# Patient Record
Sex: FEMALE | Race: WHITE | NOT HISPANIC OR LATINO | Employment: PART TIME | URBAN - METROPOLITAN AREA
[De-identification: names, ages, dates, MRNs, and addresses within clinical notes are randomized per-mention and may not be internally consistent; named-entity substitution may affect disease eponyms.]

---

## 2019-05-07 ENCOUNTER — HOSPITAL ENCOUNTER (OUTPATIENT)
Dept: RADIOLOGY | Facility: MEDICAL CENTER | Age: 29
End: 2019-05-07
Attending: OBSTETRICS & GYNECOLOGY
Payer: COMMERCIAL

## 2019-05-07 DIAGNOSIS — Z34.82 PRENATAL CARE, SUBSEQUENT PREGNANCY, SECOND TRIMESTER: ICD-10-CM

## 2019-05-07 PROCEDURE — 76805 OB US >/= 14 WKS SNGL FETUS: CPT

## 2019-06-26 ENCOUNTER — APPOINTMENT (OUTPATIENT)
Dept: OTHER | Facility: IMAGING CENTER | Age: 29
End: 2019-06-26

## 2019-08-13 ENCOUNTER — HOSPITAL ENCOUNTER (OUTPATIENT)
Dept: RADIOLOGY | Facility: MEDICAL CENTER | Age: 29
End: 2019-08-13
Attending: OBSTETRICS & GYNECOLOGY
Payer: COMMERCIAL

## 2019-08-13 DIAGNOSIS — Z34.83 ENCOUNTER FOR SUPERVISION OF OTHER NORMAL PREGNANCY, THIRD TRIMESTER: ICD-10-CM

## 2019-08-13 PROCEDURE — 76816 OB US FOLLOW-UP PER FETUS: CPT

## 2019-08-20 ENCOUNTER — APPOINTMENT (OUTPATIENT)
Dept: RADIOLOGY | Facility: MEDICAL CENTER | Age: 29
End: 2019-08-20
Attending: EMERGENCY MEDICINE
Payer: COMMERCIAL

## 2019-08-20 ENCOUNTER — HOSPITAL ENCOUNTER (OUTPATIENT)
Facility: MEDICAL CENTER | Age: 29
End: 2019-08-21
Attending: EMERGENCY MEDICINE | Admitting: INTERNAL MEDICINE
Payer: COMMERCIAL

## 2019-08-20 DIAGNOSIS — R47.01 APHASIA: ICD-10-CM

## 2019-08-20 DIAGNOSIS — R53.1 WEAKNESS: ICD-10-CM

## 2019-08-20 DIAGNOSIS — R29.810 FACIAL DROOP: ICD-10-CM

## 2019-08-20 PROBLEM — R74.8 ELEVATED ALKALINE PHOSPHATASE LEVEL: Status: ACTIVE | Noted: 2019-08-20

## 2019-08-20 PROBLEM — E87.1 HYPONATREMIA: Status: ACTIVE | Noted: 2019-08-20

## 2019-08-20 PROBLEM — R29.90 STROKE-LIKE SYMPTOMS: Status: ACTIVE | Noted: 2019-08-20

## 2019-08-20 PROBLEM — Z3A.36 36 WEEKS GESTATION OF PREGNANCY: Status: ACTIVE | Noted: 2019-08-20

## 2019-08-20 LAB
ABO GROUP BLD: NORMAL
ALBUMIN SERPL BCP-MCNC: 3.6 G/DL (ref 3.2–4.9)
ALBUMIN/GLOB SERPL: 1.1 G/DL
ALP SERPL-CCNC: 170 U/L (ref 30–99)
ALT SERPL-CCNC: 31 U/L (ref 2–50)
ANION GAP SERPL CALC-SCNC: 11 MMOL/L (ref 0–11.9)
APPEARANCE UR: CLEAR
APTT PPP: 26 SEC (ref 24.7–36)
AST SERPL-CCNC: 21 U/L (ref 12–45)
BASOPHILS # BLD AUTO: 0.3 % (ref 0–1.8)
BASOPHILS # BLD: 0.03 K/UL (ref 0–0.12)
BILIRUB SERPL-MCNC: 0.2 MG/DL (ref 0.1–1.5)
BILIRUB UR QL STRIP.AUTO: NEGATIVE
BLD GP AB SCN SERPL QL: NORMAL
BUN SERPL-MCNC: 9 MG/DL (ref 8–22)
CALCIUM SERPL-MCNC: 9.3 MG/DL (ref 8.4–10.2)
CHLORIDE SERPL-SCNC: 98 MMOL/L (ref 96–112)
CO2 SERPL-SCNC: 21 MMOL/L (ref 20–33)
COLOR UR: YELLOW
CREAT SERPL-MCNC: 0.66 MG/DL (ref 0.5–1.4)
EKG IMPRESSION: NORMAL
EOSINOPHIL # BLD AUTO: 0 K/UL (ref 0–0.51)
EOSINOPHIL NFR BLD: 0 % (ref 0–6.9)
ERYTHROCYTE [DISTWIDTH] IN BLOOD BY AUTOMATED COUNT: 39.2 FL (ref 35.9–50)
GLOBULIN SER CALC-MCNC: 3.2 G/DL (ref 1.9–3.5)
GLUCOSE BLD-MCNC: 77 MG/DL (ref 65–99)
GLUCOSE SERPL-MCNC: 82 MG/DL (ref 65–99)
GLUCOSE UR STRIP.AUTO-MCNC: NEGATIVE MG/DL
HCT VFR BLD AUTO: 41 % (ref 37–47)
HGB BLD-MCNC: 13.8 G/DL (ref 12–16)
IMM GRANULOCYTES # BLD AUTO: 0.05 K/UL (ref 0–0.11)
IMM GRANULOCYTES NFR BLD AUTO: 0.5 % (ref 0–0.9)
INR PPP: 0.85 (ref 0.87–1.13)
KETONES UR STRIP.AUTO-MCNC: NEGATIVE MG/DL
LEUKOCYTE ESTERASE UR QL STRIP.AUTO: NEGATIVE
LYMPHOCYTES # BLD AUTO: 2.21 K/UL (ref 1–4.8)
LYMPHOCYTES NFR BLD: 22.6 % (ref 22–41)
MCH RBC QN AUTO: 28.2 PG (ref 27–33)
MCHC RBC AUTO-ENTMCNC: 33.7 G/DL (ref 33.6–35)
MCV RBC AUTO: 83.7 FL (ref 81.4–97.8)
MICRO URNS: NORMAL
MONOCYTES # BLD AUTO: 0.68 K/UL (ref 0–0.85)
MONOCYTES NFR BLD AUTO: 7 % (ref 0–13.4)
NEUTROPHILS # BLD AUTO: 6.79 K/UL (ref 2–7.15)
NEUTROPHILS NFR BLD: 69.6 % (ref 44–72)
NITRITE UR QL STRIP.AUTO: NEGATIVE
NRBC # BLD AUTO: 0 K/UL
NRBC BLD-RTO: 0 /100 WBC
PH UR STRIP.AUTO: 7.5 [PH] (ref 5–8)
PLATELET # BLD AUTO: 238 K/UL (ref 164–446)
PMV BLD AUTO: 10.7 FL (ref 9–12.9)
POTASSIUM SERPL-SCNC: 3.9 MMOL/L (ref 3.6–5.5)
PROT SERPL-MCNC: 6.8 G/DL (ref 6–8.2)
PROT UR QL STRIP: NEGATIVE MG/DL
PROTHROMBIN TIME: 11.7 SEC (ref 12–14.6)
RBC # BLD AUTO: 4.9 M/UL (ref 4.2–5.4)
RBC UR QL AUTO: NEGATIVE
RH BLD: NORMAL
SODIUM SERPL-SCNC: 130 MMOL/L (ref 135–145)
SP GR UR STRIP.AUTO: <=1.005
T4 FREE SERPL-MCNC: 1.23 NG/DL (ref 0.58–1.64)
TROPONIN T SERPL-MCNC: <6 NG/L (ref 6–19)
TSH SERPL DL<=0.005 MIU/L-ACNC: 4.48 UIU/ML (ref 0.38–5.33)
WBC # BLD AUTO: 9.8 K/UL (ref 4.8–10.8)

## 2019-08-20 PROCEDURE — 99285 EMERGENCY DEPT VISIT HI MDM: CPT

## 2019-08-20 PROCEDURE — 84484 ASSAY OF TROPONIN QUANT: CPT

## 2019-08-20 PROCEDURE — 93005 ELECTROCARDIOGRAM TRACING: CPT | Performed by: EMERGENCY MEDICINE

## 2019-08-20 PROCEDURE — 86901 BLOOD TYPING SEROLOGIC RH(D): CPT

## 2019-08-20 PROCEDURE — 80053 COMPREHEN METABOLIC PANEL: CPT

## 2019-08-20 PROCEDURE — 36415 COLL VENOUS BLD VENIPUNCTURE: CPT

## 2019-08-20 PROCEDURE — 70498 CT ANGIOGRAPHY NECK: CPT

## 2019-08-20 PROCEDURE — 85730 THROMBOPLASTIN TIME PARTIAL: CPT

## 2019-08-20 PROCEDURE — 700117 HCHG RX CONTRAST REV CODE 255: Performed by: EMERGENCY MEDICINE

## 2019-08-20 PROCEDURE — G0378 HOSPITAL OBSERVATION PER HR: HCPCS

## 2019-08-20 PROCEDURE — 84443 ASSAY THYROID STIM HORMONE: CPT

## 2019-08-20 PROCEDURE — 94760 N-INVAS EAR/PLS OXIMETRY 1: CPT

## 2019-08-20 PROCEDURE — 85025 COMPLETE CBC W/AUTO DIFF WBC: CPT

## 2019-08-20 PROCEDURE — 99218 PR INITIAL OBSERVATION CARE,LEVL I: CPT | Performed by: INTERNAL MEDICINE

## 2019-08-20 PROCEDURE — 86900 BLOOD TYPING SEROLOGIC ABO: CPT

## 2019-08-20 PROCEDURE — 84439 ASSAY OF FREE THYROXINE: CPT

## 2019-08-20 PROCEDURE — 70450 CT HEAD/BRAIN W/O DYE: CPT

## 2019-08-20 PROCEDURE — 71045 X-RAY EXAM CHEST 1 VIEW: CPT

## 2019-08-20 PROCEDURE — 82962 GLUCOSE BLOOD TEST: CPT

## 2019-08-20 PROCEDURE — 81003 URINALYSIS AUTO W/O SCOPE: CPT

## 2019-08-20 PROCEDURE — 85610 PROTHROMBIN TIME: CPT

## 2019-08-20 PROCEDURE — 86850 RBC ANTIBODY SCREEN: CPT

## 2019-08-20 PROCEDURE — 70496 CT ANGIOGRAPHY HEAD: CPT

## 2019-08-20 RX ORDER — SODIUM CHLORIDE 9 MG/ML
INJECTION, SOLUTION INTRAVENOUS CONTINUOUS
Status: DISCONTINUED | OUTPATIENT
Start: 2019-08-20 | End: 2019-08-21 | Stop reason: HOSPADM

## 2019-08-20 RX ORDER — ONDANSETRON 4 MG/1
4 TABLET, ORALLY DISINTEGRATING ORAL EVERY 4 HOURS PRN
Status: DISCONTINUED | OUTPATIENT
Start: 2019-08-20 | End: 2019-08-21 | Stop reason: HOSPADM

## 2019-08-20 RX ORDER — ONDANSETRON 2 MG/ML
4 INJECTION INTRAMUSCULAR; INTRAVENOUS EVERY 4 HOURS PRN
Status: DISCONTINUED | OUTPATIENT
Start: 2019-08-20 | End: 2019-08-21 | Stop reason: HOSPADM

## 2019-08-20 RX ORDER — ACETAMINOPHEN 325 MG/1
650 TABLET ORAL EVERY 6 HOURS PRN
Status: DISCONTINUED | OUTPATIENT
Start: 2019-08-20 | End: 2019-08-21 | Stop reason: HOSPADM

## 2019-08-20 RX ADMIN — IOHEXOL 100 ML: 350 INJECTION, SOLUTION INTRAVENOUS at 21:52

## 2019-08-20 ASSESSMENT — ENCOUNTER SYMPTOMS
DIARRHEA: 0
SENSORY CHANGE: 1
CHILLS: 0
FALLS: 0
PALPITATIONS: 0
COUGH: 0
TINGLING: 0
ABDOMINAL PAIN: 0
MYALGIAS: 0
LOSS OF CONSCIOUSNESS: 0
SHORTNESS OF BREATH: 0
WEAKNESS: 0
DEPRESSION: 0
STRIDOR: 0
SPUTUM PRODUCTION: 0
HEADACHES: 1
DIZZINESS: 0
CONSTIPATION: 0
NAUSEA: 0
FEVER: 0
VOMITING: 0
SPEECH CHANGE: 1

## 2019-08-21 ENCOUNTER — APPOINTMENT (OUTPATIENT)
Dept: CARDIOLOGY | Facility: MEDICAL CENTER | Age: 29
End: 2019-08-21
Attending: INTERNAL MEDICINE
Payer: COMMERCIAL

## 2019-08-21 ENCOUNTER — APPOINTMENT (OUTPATIENT)
Dept: RADIOLOGY | Facility: MEDICAL CENTER | Age: 29
End: 2019-08-21
Attending: INTERNAL MEDICINE
Payer: COMMERCIAL

## 2019-08-21 ENCOUNTER — PATIENT OUTREACH (OUTPATIENT)
Dept: HEALTH INFORMATION MANAGEMENT | Facility: OTHER | Age: 29
End: 2019-08-21

## 2019-08-21 VITALS
BODY MASS INDEX: 25.16 KG/M2 | HEART RATE: 68 BPM | OXYGEN SATURATION: 96 % | SYSTOLIC BLOOD PRESSURE: 106 MMHG | HEIGHT: 68 IN | RESPIRATION RATE: 16 BRPM | TEMPERATURE: 98.5 F | DIASTOLIC BLOOD PRESSURE: 57 MMHG | WEIGHT: 166.01 LBS

## 2019-08-21 LAB
ANION GAP SERPL CALC-SCNC: 9 MMOL/L (ref 0–11.9)
BUN SERPL-MCNC: 8 MG/DL (ref 8–22)
CALCIUM SERPL-MCNC: 8.9 MG/DL (ref 8.4–10.2)
CHLORIDE SERPL-SCNC: 101 MMOL/L (ref 96–112)
CHOLEST SERPL-MCNC: 299 MG/DL (ref 100–199)
CO2 SERPL-SCNC: 20 MMOL/L (ref 20–33)
CREAT SERPL-MCNC: 0.54 MG/DL (ref 0.5–1.4)
ERYTHROCYTE [DISTWIDTH] IN BLOOD BY AUTOMATED COUNT: 39.6 FL (ref 35.9–50)
EST. AVERAGE GLUCOSE BLD GHB EST-MCNC: 108 MG/DL
GLUCOSE SERPL-MCNC: 76 MG/DL (ref 65–99)
HBA1C MFR BLD: 5.4 % (ref 0–5.6)
HCT VFR BLD AUTO: 37 % (ref 37–47)
HDLC SERPL-MCNC: 96 MG/DL
HGB BLD-MCNC: 12.3 G/DL (ref 12–16)
LDLC SERPL CALC-MCNC: 160 MG/DL
LV EJECT FRACT  99904: 55
LV EJECT FRACT MOD 2C 99903: 54.77
LV EJECT FRACT MOD 4C 99902: 47.42
LV EJECT FRACT MOD BP 99901: 52.36
MCH RBC QN AUTO: 28.3 PG (ref 27–33)
MCHC RBC AUTO-ENTMCNC: 33.2 G/DL (ref 33.6–35)
MCV RBC AUTO: 85.3 FL (ref 81.4–97.8)
PLATELET # BLD AUTO: 195 K/UL (ref 164–446)
PMV BLD AUTO: 10.9 FL (ref 9–12.9)
POTASSIUM SERPL-SCNC: 3.6 MMOL/L (ref 3.6–5.5)
RBC # BLD AUTO: 4.34 M/UL (ref 4.2–5.4)
SODIUM SERPL-SCNC: 130 MMOL/L (ref 135–145)
TRIGL SERPL-MCNC: 213 MG/DL (ref 0–149)
WBC # BLD AUTO: 10.2 K/UL (ref 4.8–10.8)

## 2019-08-21 PROCEDURE — 80061 LIPID PANEL: CPT

## 2019-08-21 PROCEDURE — 99217 PR OBSERVATION CARE DISCHARGE: CPT | Performed by: HOSPITALIST

## 2019-08-21 PROCEDURE — 97535 SELF CARE MNGMENT TRAINING: CPT

## 2019-08-21 PROCEDURE — G0378 HOSPITAL OBSERVATION PER HR: HCPCS

## 2019-08-21 PROCEDURE — 70551 MRI BRAIN STEM W/O DYE: CPT

## 2019-08-21 PROCEDURE — 93306 TTE W/DOPPLER COMPLETE: CPT

## 2019-08-21 PROCEDURE — 85027 COMPLETE CBC AUTOMATED: CPT

## 2019-08-21 PROCEDURE — 700102 HCHG RX REV CODE 250 W/ 637 OVERRIDE(OP): Performed by: INTERNAL MEDICINE

## 2019-08-21 PROCEDURE — 93306 TTE W/DOPPLER COMPLETE: CPT | Mod: 26 | Performed by: INTERNAL MEDICINE

## 2019-08-21 PROCEDURE — A9270 NON-COVERED ITEM OR SERVICE: HCPCS | Performed by: INTERNAL MEDICINE

## 2019-08-21 PROCEDURE — 80048 BASIC METABOLIC PNL TOTAL CA: CPT

## 2019-08-21 PROCEDURE — 700105 HCHG RX REV CODE 258: Performed by: INTERNAL MEDICINE

## 2019-08-21 PROCEDURE — 83036 HEMOGLOBIN GLYCOSYLATED A1C: CPT

## 2019-08-21 RX ORDER — LEVOTHYROXINE SODIUM 0.05 MG/1
50 TABLET ORAL
COMMUNITY

## 2019-08-21 RX ADMIN — ACETAMINOPHEN 650 MG: 325 TABLET, FILM COATED ORAL at 00:42

## 2019-08-21 RX ADMIN — SODIUM CHLORIDE: 9 INJECTION, SOLUTION INTRAVENOUS at 14:01

## 2019-08-21 RX ADMIN — SODIUM CHLORIDE: 9 INJECTION, SOLUTION INTRAVENOUS at 00:43

## 2019-08-21 SDOH — HEALTH STABILITY: MENTAL HEALTH: HOW OFTEN DO YOU HAVE 6 OR MORE DRINKS ON ONE OCCASION?: NEVER

## 2019-08-21 SDOH — HEALTH STABILITY: MENTAL HEALTH: HOW MANY STANDARD DRINKS CONTAINING ALCOHOL DO YOU HAVE ON A TYPICAL DAY?: PATIENT DECLINED

## 2019-08-21 SDOH — HEALTH STABILITY: MENTAL HEALTH: HOW OFTEN DO YOU HAVE A DRINK CONTAINING ALCOHOL?: NEVER

## 2019-08-21 ASSESSMENT — COGNITIVE AND FUNCTIONAL STATUS - GENERAL
DAILY ACTIVITIY SCORE: 24
DAILY ACTIVITIY SCORE: 24
SUGGESTED CMS G CODE MODIFIER MOBILITY: CH
SUGGESTED CMS G CODE MODIFIER DAILY ACTIVITY: CH
MOBILITY SCORE: 24
SUGGESTED CMS G CODE MODIFIER DAILY ACTIVITY: CH

## 2019-08-21 ASSESSMENT — LIFESTYLE VARIABLES
EVER_SMOKED: NEVER
CONSUMPTION TOTAL: NEGATIVE
TOTAL SCORE: 0
EVER FELT BAD OR GUILTY ABOUT YOUR DRINKING: NO
ALCOHOL_USE: NO
TOTAL SCORE: 0
HAVE PEOPLE ANNOYED YOU BY CRITICIZING YOUR DRINKING: NO
HAVE YOU EVER FELT YOU SHOULD CUT DOWN ON YOUR DRINKING: NO
AVERAGE NUMBER OF DAYS PER WEEK YOU HAVE A DRINK CONTAINING ALCOHOL: 0
ON A TYPICAL DAY WHEN YOU DRINK ALCOHOL HOW MANY DRINKS DO YOU HAVE: 0
EVER HAD A DRINK FIRST THING IN THE MORNING TO STEADY YOUR NERVES TO GET RID OF A HANGOVER: NO
TOTAL SCORE: 0
HOW MANY TIMES IN THE PAST YEAR HAVE YOU HAD 5 OR MORE DRINKS IN A DAY: 0

## 2019-08-21 ASSESSMENT — PATIENT HEALTH QUESTIONNAIRE - PHQ9
SUM OF ALL RESPONSES TO PHQ9 QUESTIONS 1 AND 2: 0
2. FEELING DOWN, DEPRESSED, IRRITABLE, OR HOPELESS: NOT AT ALL
1. LITTLE INTEREST OR PLEASURE IN DOING THINGS: NOT AT ALL

## 2019-08-21 ASSESSMENT — ACTIVITIES OF DAILY LIVING (ADL): TOILETING: INDEPENDENT

## 2019-08-21 NOTE — PROGRESS NOTES
Received report from Darlin.    Morning Assessment complete. POC discussed with patient. Complains of a headache, ice pack provided, denies any need for other interventions at this time. Q4 Neuro check complete, no concerns noted.

## 2019-08-21 NOTE — PROGRESS NOTES
Telemetry Shift Summary    Rhythm SB,SR  HR Range 40-70  Ectopy none  Measurements 0.14/0.08/0.40        Normal Values  Rhythm SR  HR Range    Measurements 0.12-0.20 / 0.06-0.10  / 0.30-0.52

## 2019-08-21 NOTE — ED NOTES
Pt went to CT with staff  Now back  Feeling better  Speaking in short sentences now  FHT's 160 noted  VSKAREN PORTILLO at BS for re-evaluation

## 2019-08-21 NOTE — PROGRESS NOTES
2 RN skin check complete with FLORESITA Castro.  Devices in place none.  Skin assessed under devices none.  Confirmed pressure ulcer found none.  New potential pressure ulcers noted on none.  Wound consult placed and reported n/a.  The following interventions in place pt encouraged on frequent turns while in bed and early ambulation.

## 2019-08-21 NOTE — ED PROVIDER NOTES
"ED Provider Note    CHIEF COMPLAINT  Expressive aphasia, LUE weakness, facial droop    HPI  Maddie Santo is a 29 y.o. female who presents to the ED with expressive aphasia, right upper extremity weakness and numbness, and facial droop.  History is mainly obtained from the .  He states that at approximately 10 minutes prior to arrival, the patient came to him and it was speaking differently.  He noticed that her face seemed asymmetric and she complained of right upper externally weakness and numbness.  She also complained of numbness around her mouth.  The patient is 36 weeks pregnant and her pregnancy has been uncomplicated thus far.  The patient denies any headaches or changes in vision.  She has not had any fevers or recent illnesses.  She is otherwise been doing well prior to tonight.    REVIEW OF SYSTEMS  See HPI for further details. All other systems are negative.     PAST MEDICAL HISTORY       SOCIAL HISTORY  Social History     Tobacco Use   • Smoking status: Not on file   Substance and Sexual Activity   • Alcohol use: Not on file   • Drug use: Not on file   • Sexual activity: Not on file       SURGICAL HISTORY  patient denies any surgical history    CURRENT MEDICATIONS  Home Medications     Reviewed by Ev Quinteros R.N. (Registered Nurse) on 08/20/19 at 8232  Med List Status: Not Addressed   Medication Last Dose Status        Patient Francisco Javier Taking any Medications                     ALLERGIES  Not on File    PHYSICAL EXAM  VITAL SIGNS: /78   Pulse (!) 50   Temp 36.2 °C (97.2 °F) (Temporal)   Resp (!) 21   Ht 1.626 m (5' 4\")   Wt 74.4 kg (164 lb 0.4 oz)   SpO2 97%   BMI 28.15 kg/m²    Constitutional: Alert and in no apparent distress.  HENT: Normocephalic atraumatic. Bilateral external ears normal. Nose normal. Mucous membranes are moist.  Eyes: Pupils are equal and reactive. Conjunctiva normal. Non-icteric sclera.   Neck: Normal range of motion without tenderness. Supple. No " meningeal signs.  Cardiovascular: Regular rate and rhythm. No murmurs, gallops or rubs.  Thorax & Lungs: Breath sounds are clear to auscultation bilaterally. No wheezing, rhonchi or rales.  Abdomen: Soft, nontender and nondistended. No peritoneal signs noted. Gravid uterus noted.   Skin: Warm and dry. No rashes are noted.  Back: No bony tenderness, No CVA tenderness.   Extremities: 2+ peripheral pulses. Cap refill is less than 2 seconds. No edema, cyanosis, or clubbing.  Musculoskeletal: Good range of motion in all major joints. No tenderness to palpation or major deformities noted.   Neurologic: Alert.  The patient is aphasic. She is moving all 4 extremities equally and her coordination is intact.   Psychiatric: Affect is normal. Judgment appears to be intact.    DIAGNOSTIC STUDIES / PROCEDURES    LABS  Results for orders placed or performed during the hospital encounter of 08/20/19   CBC WITH DIFFERENTIAL   Result Value Ref Range    WBC 9.8 4.8 - 10.8 K/uL    RBC 4.90 4.20 - 5.40 M/uL    Hemoglobin 13.8 12.0 - 16.0 g/dL    Hematocrit 41.0 37.0 - 47.0 %    MCV 83.7 81.4 - 97.8 fL    MCH 28.2 27.0 - 33.0 pg    MCHC 33.7 33.6 - 35.0 g/dL    RDW 39.2 35.9 - 50.0 fL    Platelet Count 238 164 - 446 K/uL    MPV 10.7 9.0 - 12.9 fL    Neutrophils-Polys 69.60 44.00 - 72.00 %    Lymphocytes 22.60 22.00 - 41.00 %    Monocytes 7.00 0.00 - 13.40 %    Eosinophils 0.00 0.00 - 6.90 %    Basophils 0.30 0.00 - 1.80 %    Immature Granulocytes 0.50 0.00 - 0.90 %    Nucleated RBC 0.00 /100 WBC    Neutrophils (Absolute) 6.79 2.00 - 7.15 K/uL    Lymphs (Absolute) 2.21 1.00 - 4.80 K/uL    Monos (Absolute) 0.68 0.00 - 0.85 K/uL    Eos (Absolute) 0.00 0.00 - 0.51 K/uL    Baso (Absolute) 0.03 0.00 - 0.12 K/uL    Immature Granulocytes (abs) 0.05 0.00 - 0.11 K/uL    NRBC (Absolute) 0.00 K/uL   COMP METABOLIC PANEL   Result Value Ref Range    Sodium 130 (L) 135 - 145 mmol/L    Potassium 3.9 3.6 - 5.5 mmol/L    Chloride 98 96 - 112 mmol/L    Co2  21 20 - 33 mmol/L    Anion Gap 11.0 0.0 - 11.9    Glucose 82 65 - 99 mg/dL    Bun 9 8 - 22 mg/dL    Creatinine 0.66 0.50 - 1.40 mg/dL    Calcium 9.3 8.4 - 10.2 mg/dL    AST(SGOT) 21 12 - 45 U/L    ALT(SGPT) 31 2 - 50 U/L    Alkaline Phosphatase 170 (H) 30 - 99 U/L    Total Bilirubin 0.2 0.1 - 1.5 mg/dL    Albumin 3.6 3.2 - 4.9 g/dL    Total Protein 6.8 6.0 - 8.2 g/dL    Globulin 3.2 1.9 - 3.5 g/dL    A-G Ratio 1.1 g/dL   PROTHROMBIN TIME   Result Value Ref Range    PT 11.7 (L) 12.0 - 14.6 sec    INR 0.85 (L) 0.87 - 1.13   APTT   Result Value Ref Range    APTT 26.0 24.7 - 36.0 sec   COD (ADULT)   Result Value Ref Range    ABO Grouping Only O     Rh Grouping Only POS     Antibody Screen-Cod NEG    TROPONIN   Result Value Ref Range    Troponin T <6 6 - 19 ng/L   URINALYSIS CULTURE, IF INDICATED   Result Value Ref Range    Color Yellow     Character Clear     Specific Gravity <=1.005 <1.035    Ph 7.5 5.0 - 8.0    Glucose Negative Negative mg/dL    Ketones Negative Negative mg/dL    Protein Negative Negative mg/dL    Bilirubin Negative Negative    Nitrite Negative Negative    Leukocyte Esterase Negative Negative    Occult Blood Negative Negative    Micro Urine Req see below    TSH   Result Value Ref Range    TSH 4.480 0.380 - 5.330 uIU/mL   FREE THYROXINE   Result Value Ref Range    Free T-4 1.23 0.58 - 1.64 ng/dL   ESTIMATED GFR   Result Value Ref Range    GFR If African American >60 >60 mL/min/1.73 m 2    GFR If Non African American >60 >60 mL/min/1.73 m 2   ACCU-CHEK GLUCOSE   Result Value Ref Range    Glucose - Accu-Ck 77 65 - 99 mg/dL   EKG (NOW)   Result Value Ref Range    Report       [facility] Emergency Dept.    Test Date:  2019  Pt Name:    CURTIS HASKINS               Department: [facility]  MRN:        [id number]                      Room:       -ROOM 6  Gender:     Female                       Technician: MARIELLE  :        1990                   Requested By:ER TRIAGE  PROTOCOL  Order #:    [id number]                    Reading MD:    Measurements  Intervals                                Axis  Rate:       47                           P:          14  HI:         136                          QRS:        51  QRSD:       76                           T:          36  QT:         444  QTc:        393    Interpretive Statements  SINUS BRADYCARDIA  No previous ECG available for comparison         RADIOLOGY  CT-CTA HEAD WITH & W/O-POST PROCESS   Final Result      CT angiogram of the [place] within normal limits.      CT-CTA NECK WITH & W/O-POST PROCESSING   Final Result      CT angiogram of the neck within normal limits.      DX-CHEST-PORTABLE (1 VIEW)   Final Result      No acute cardiac or pulmonary abnormalities are identified.      CT-HEAD W/O   Final Result      Head CT without contrast within normal limits. No evidence of acute cerebral infarction, hemorrhage or mass lesion.      MR-BRAIN-W/O    (Results Pending)   EC-ECHOCARDIOGRAM COMPLETE W/O CONT    (Results Pending)     COURSE & MEDICAL DECISION MAKING  Pertinent Labs & Imaging studies reviewed. (See chart for details)    9:28 PM - I discussed the case with Dr Fernandez. He stated that TPA is not completely contraindicated in pregnancy.    10:05 PM - Patient reevaluated and her symptoms have completely resolved.  Her NIH stroke scale at this time is 0.    10:11 PM - I updated Dr. Fernandez on the normal CT/CTA as well as the patient's resolution of symptoms.  He agreed to not give TPA at this time.    10:27 PM - I discussed the case with Dr Arana who agreed with the plan and accepted the patient.     This is a 29-year-old female presenting to the ED with aphasia and right-sided upper extremity weakness and facial droop.  On initial evaluation, she was noted to be aphasic but did not have any weakness. Her last known normal was 20 min ago, so code stroke was run. She was sent for CT/CTA and I discussed the case with  neurology.  Scans were normal and her symptoms resolved by the time she got back from the scans.  Per neurology recommendations, TPA was held at this time.  The remainder of her labs, EKG, and chest x-ray were also reassuring.  I do not think that she has eclampsia as her blood pressure was normal.  The plan was made to admit for MRI and further work-up.  Fetal heart tones were obtained and reassuring in the 140's. The patient remained stable while in emergency department.    FINAL IMPRESSION  1. Aphasia    2. Facial droop    3. Weakness      -ADMIT-    Electronically signed by: Talisha River, 8/20/2019 9:24 PM

## 2019-08-21 NOTE — H&P
Hospital Medicine History & Physical Note    Date of Service  8/20/2019    Primary Care Physician  Leonor Mayfield M.D.    Consultants  None however ERP did discuss the case with neurology    Code Status  Full    Chief Complaint  Difficulty speaking    History of Presenting Illness  29 y.o. female who presented 8/20/2019 with difficulty speaking and right-sided numbness.  Patient states she did have trouble sleeping overnight, she attributed that to the fact that she is 36 weeks pregnant and this is not uncommon.  She states because of this she woke up fatigue this morning.  She did develop diarrhea and has had multiple episodes all day, this started today as well.  She states around 9 PM she began noticing some right sided numbness just above her upper lip and then it spread to the right side of her face and down her chin.  She states then it went to her right thumb then expanded to her right fingers and then up her right arm.  She states then she was unable to speak.  Her  was there, she was unable to out, occasionally she would get a word out.  She was aware that she was having trouble speaking.  Once they pulled up to the hospital here, the numbness improved.  After they have been here about 30 minutes her speaking improved as well.  She denied any weakness however does states she is fatigued and has a headache.  Of note, she has 36 weeks pregnant, just had her last checkup on Monday and her pregnancy is going along as planned.  Patient has not had any contractions.  As far her diarrhea, she denied any sick contacts or eating any questionable food.  No recent antibiotics.  I did discuss the case including labs and imaging with the ER physician.    Review of Systems  Review of Systems   Constitutional: Negative for chills, fever and malaise/fatigue.   HENT: Negative for congestion.    Respiratory: Negative for cough, sputum production, shortness of breath and stridor.    Cardiovascular: Negative for  chest pain, palpitations and leg swelling.   Gastrointestinal: Negative for abdominal pain, constipation, diarrhea, nausea and vomiting.   Genitourinary: Negative for dysuria and urgency.   Musculoskeletal: Negative for falls and myalgias.   Neurological: Positive for sensory change, speech change and headaches. Negative for dizziness, tingling, loss of consciousness and weakness.   Psychiatric/Behavioral: Negative for depression and suicidal ideas.   All other systems reviewed and are negative.      Past Medical History  None    Surgical History  None    Family History  Stroke    Social History   She denies any tobacco, alcohol or illicit drug use    Allergies  No known drug allergies    Medications  None       Physical Exam  Temp:  [36.2 °C (97.2 °F)] 36.2 °C (97.2 °F)  Pulse:  [49-63] 50  Resp:  [15-21] 21  BP: (113-133)/(75-88) 113/78  SpO2:  [96 %-98 %] 97 %    Physical Exam   Constitutional: She is oriented to person, place, and time. She appears well-developed. She is cooperative. No distress.   HENT:   Head: Normocephalic and atraumatic. Not macrocephalic and not microcephalic. Head is without raccoon's eyes and without Hopper's sign.   Right Ear: External ear normal.   Left Ear: External ear normal.   Mouth/Throat: Oropharynx is clear and moist. No oropharyngeal exudate.   Eyes: Conjunctivae are normal. Right eye exhibits no discharge. Left eye exhibits no discharge. No scleral icterus.   Neck: Neck supple. No tracheal deviation present.   Cardiovascular: Normal rate, regular rhythm and intact distal pulses. Exam reveals no gallop, no distant heart sounds and no friction rub.   No murmur heard.  Pulmonary/Chest: Effort normal. No accessory muscle usage or stridor. No tachypnea and no bradypnea. No respiratory distress. She has no decreased breath sounds. She has no wheezes. She has no rhonchi. She has no rales. She exhibits no tenderness.   Abdominal: Soft. Bowel sounds are normal. She exhibits no  distension. There is no hepatosplenomegaly, splenomegaly or hepatomegaly. There is tenderness (36 weeks pregnant ). There is no rebound and no guarding.   Musculoskeletal: Normal range of motion. She exhibits no edema or tenderness.   Lymphadenopathy:     She has no cervical adenopathy.   Neurological: She is alert and oriented to person, place, and time. No cranial nerve deficit. She displays no seizure activity.   Skin: Skin is warm, dry and intact. No rash noted. She is not diaphoretic. No erythema. No pallor.   Psychiatric: She has a normal mood and affect. Her speech is normal and behavior is normal. Judgment and thought content normal. Cognition and memory are normal.   Nursing note and vitals reviewed.      Laboratory:  Recent Labs     08/20/19 2105   WBC 9.8   RBC 4.90   HEMOGLOBIN 13.8   HEMATOCRIT 41.0   MCV 83.7   MCH 28.2   MCHC 33.7   RDW 39.2   PLATELETCT 238   MPV 10.7     Recent Labs     08/20/19 2105   SODIUM 130*   POTASSIUM 3.9   CHLORIDE 98   CO2 21   GLUCOSE 82   BUN 9   CREATININE 0.66   CALCIUM 9.3     Recent Labs     08/20/19 2105   ALTSGPT 31   ASTSGOT 21   ALKPHOSPHAT 170*   TBILIRUBIN 0.2   GLUCOSE 82     Recent Labs     08/20/19 2105   APTT 26.0   INR 0.85*     No results for input(s): NTPROBNP in the last 72 hours.      Recent Labs     08/20/19 2105   TROPONINT <6       Urinalysis:    No results found     Imaging:  CT-CTA HEAD WITH & W/O-POST PROCESS   Final Result      CT angiogram of the [place] within normal limits.      CT-CTA NECK WITH & W/O-POST PROCESSING   Final Result      CT angiogram of the neck within normal limits.      DX-CHEST-PORTABLE (1 VIEW)   Final Result      No acute cardiac or pulmonary abnormalities are identified.      CT-HEAD W/O   Final Result      Head CT without contrast within normal limits. No evidence of acute cerebral infarction, hemorrhage or mass lesion.      MR-BRAIN-W/O    (Results Pending)   EC-ECHOCARDIOGRAM COMPLETE W/O CONT     (Results Pending)         Assessment/Plan:  I anticipate this patient is appropriate for observation status at this time.    * Stroke-like symptoms  Assessment & Plan  -Patient had facial droop, difficulty speaking as well as right-sided numbness all of which are resolved  -Imaging thus far has been negative  -Obtain MRI brain as well as echocardiogram  -I did personally review the CT head, noted no acute intracranial abnormalities  -Unfortunately, I am unable to fully treat for possible stroke due to her pregnancy    Elevated alkaline phosphatase level  Assessment & Plan  -Mild, asymptomatic  -Repeat CMP in the morning    Hyponatremia  Assessment & Plan  -Due to dehydration  -Start IV fluids  -Repeat BMP in the morning    36 weeks gestation of pregnancy  Assessment & Plan  -She did just have an appointment on Monday, pregnancy is advancing as expected  -She continues to feel the baby moving  -No need for OB services at this time      VTE prophylaxis: SCDs

## 2019-08-21 NOTE — CARE PLAN
Problem: Communication  Goal: The ability to communicate needs accurately and effectively will improve  Outcome: PROGRESSING AS EXPECTED  POC updated with pt, questions answered, pt given time to voice concerns, no additional questions at this time.       Problem: Venous Thromboembolism (VTW)/Deep Vein Thrombosis (DVT) Prevention:  Goal: Patient will participate in Venous Thrombosis (VTE)/Deep Vein Thrombosis (DVT)Prevention Measures  Outcome: PROGRESSING AS EXPECTED  Pt ambulates

## 2019-08-21 NOTE — ASSESSMENT & PLAN NOTE
-She did just have an appointment on Monday, pregnancy is advancing as expected  -She continues to feel the baby moving  -No need for OB services at this time

## 2019-08-21 NOTE — PROGRESS NOTES
Telemetry Shift Summary    Rhythm SB  HR Range 48-52  Ectopy none  Measurements .14/.08/.42          Normal Values  Rhythm SR  HR Range    Measurements 0.12-0.20 / 0.06-0.10  / 0.30-0.52

## 2019-08-21 NOTE — ASSESSMENT & PLAN NOTE
-Patient had facial droop, difficulty speaking as well as right-sided numbness all of which are resolved  -Imaging thus far has been negative  -Obtain MRI brain as well as echocardiogram  -I did personally review the CT head, noted no acute intracranial abnormalities  -Unfortunately, I am unable to fully treat for possible stroke due to her pregnancy

## 2019-08-21 NOTE — ED TRIAGE NOTES
"Chief Complaint   Patient presents with   • Possible Stroke     2050 was last known well time, pt was at home and was cleaning around house per pt  - pt told  she started to have numbness in face and thought she was having and allergic reaction. Pt presents to ED w/ difficulty speaking - unable to make full sentences.      /84   Pulse 63   Temp 36.2 °C (97.2 °F) (Temporal)   Resp 18   Ht 1.626 m (5' 4\")   Wt 74.4 kg (164 lb 0.4 oz)   SpO2 97%   BMI 28.15 kg/m²     Pt BIB  for above concern, pt roomed immediatly, stroke protocol ordered and ERP at the BS   "

## 2019-08-21 NOTE — DISCHARGE SUMMARY
Discharge Summary    CHIEF COMPLAINT ON ADMISSION  Chief Complaint   Patient presents with   • Possible Stroke     2050 was last known well time, pt was at home and was cleaning around house per pt  - pt told  she started to have numbness in face and thought she was having and allergic reaction. Pt presents to ED w/ difficulty speaking - unable to make full sentences.        Reason for Admission  Other     Admission Date  8/20/2019    CODE STATUS  Prior    HPI & HOSPITAL COURSE  This is a 29 y.o. female here with difficulty speaking and numbness followed by a headache. She was admitted with a concern for stroke-like symptoms. She is 36 weeks pregnant. Her symptoms improved rapidly. The remainder of her workup was negative including an MRI of the brain. The most likely working diagnosis is a complicated migraine. For now this is her first episode and given her pregnancy, she will not be given any prescriptions for migraines. She has follow up with OB in the near future. She has no symptoms at the time of discharge.        Therefore, she is discharged in good and stable condition to home with close outpatient follow-up.        Discharge Date  8/21/2019    FOLLOW UP ITEMS POST DISCHARGE  none    DISCHARGE DIAGNOSES  Principal Problem:    Stroke-like symptoms POA: Unknown  Active Problems:    36 weeks gestation of pregnancy POA: Yes    Hyponatremia POA: Yes    Elevated alkaline phosphatase level POA: Yes  Resolved Problems:    * No resolved hospital problems. *      FOLLOW UP  No future appointments.  Leonor Mayfield M.D.  [address] [facility] [place] [address]  [phone]    Schedule an appointment as soon as possible for a visit  PLEASE CALL PCP OFFICE DIRECTLY TO ARRANGE YOUR FOLLOW UP APPOINTMENT. THANK YOU      MEDICATIONS ON DISCHARGE     Medication List      CONTINUE taking these medications      Instructions   levothyroxine 50 MCG Tabs  Commonly known as:  SYNTHROID   Take 50 mcg by mouth Every  morning on an empty stomach. Indications: Underactive Thyroid  Dose:  50 mcg            Allergies  Not on File    DIET  No orders of the defined types were placed in this encounter.      ACTIVITY  As tolerated.  Weight bearing as tolerated    CONSULTATIONS  none    PROCEDURES  none    LABORATORY  Lab Results   Component Value Date    SODIUM 130 (L) 08/21/2019    POTASSIUM 3.6 08/21/2019    CHLORIDE 101 08/21/2019    CO2 20 08/21/2019    GLUCOSE 76 08/21/2019    BUN 8 08/21/2019    CREATININE 0.54 08/21/2019        Lab Results   Component Value Date    WBC 10.2 08/21/2019    HEMOGLOBIN 12.3 08/21/2019    HEMATOCRIT 37.0 08/21/2019    PLATELETCT 195 08/21/2019        Total time of the discharge process exceeds 34 minutes.

## 2019-08-22 NOTE — PROGRESS NOTES
Pt discharged home per doctor's order.  Pt discharged home with  at 1700. Discharge instructions reviewed. Pt verbalized understanding of f/u appt. Pt sent home with belongings in hand.  Pt left walking with . Tele monitored removed.    Tele box returned.

## 2020-05-08 ENCOUNTER — OFFICE VISIT (OUTPATIENT)
Dept: URGENT CARE | Facility: CLINIC | Age: 30
End: 2020-05-08
Payer: OTHER GOVERNMENT

## 2020-05-08 VITALS
DIASTOLIC BLOOD PRESSURE: 68 MMHG | RESPIRATION RATE: 16 BRPM | SYSTOLIC BLOOD PRESSURE: 100 MMHG | TEMPERATURE: 97.9 F | WEIGHT: 150 LBS | HEIGHT: 68 IN | BODY MASS INDEX: 22.73 KG/M2 | OXYGEN SATURATION: 98 % | HEART RATE: 84 BPM

## 2020-05-08 DIAGNOSIS — N61.0 MASTITIS: ICD-10-CM

## 2020-05-08 DIAGNOSIS — N61.0 NIPPLE INFECTION: ICD-10-CM

## 2020-05-08 PROCEDURE — 99204 OFFICE O/P NEW MOD 45 MIN: CPT | Performed by: FAMILY MEDICINE

## 2020-05-08 RX ORDER — FLUCONAZOLE 150 MG/1
150 TABLET ORAL
Qty: 3 TAB | Refills: 0 | Status: SHIPPED | OUTPATIENT
Start: 2020-05-08

## 2020-05-08 RX ORDER — CEPHALEXIN 500 MG/1
500 CAPSULE ORAL 4 TIMES DAILY
Qty: 28 CAP | Refills: 0 | Status: SHIPPED | OUTPATIENT
Start: 2020-05-08 | End: 2020-05-15

## 2020-05-08 ASSESSMENT — FIBROSIS 4 INDEX: FIB4 SCORE: 0.58

## 2020-05-08 NOTE — PATIENT INSTRUCTIONS
Mastitis  Mastitis is inflammation of the breast tissue. It occurs most often in women who are breastfeeding, but it can also affect other women, and even sometimes men.  CAUSES   Mastitis is usually caused by a bacterial infection. Bacteria enter the breast tissue through cuts or openings in the skin. Typically, this occurs with breastfeeding because of cracked or irritated skin. Sometimes, it can occur even when there is no opening in the skin. It can be associated with plugged milk (lactiferous) ducts. Nipple piercing can also lead to mastitis. Also, some forms of breast cancer can cause mastitis.  SIGNS AND SYMPTOMS   · Swelling, redness, tenderness, and pain in an area of the breast.  · Swelling of the glands under the arm on the same side.  · Fever.  If an infection is allowed to progress, a collection of pus (abscess) may develop.  DIAGNOSIS   Your health care provider can usually diagnose mastitis based on your symptoms and a physical exam. Tests may be done to help confirm the diagnosis. These may include:   · Removal of pus from the breast by applying pressure to the area. This pus can be examined in the lab to determine which bacteria are present. If an abscess has developed, the fluid in the abscess can be removed with a needle. This can also be used to confirm the diagnosis and determine the bacteria present. In most cases, pus will not be present.  · Blood tests to determine if your body is fighting a bacterial infection.  · Mammogram or ultrasound tests to rule out other problems or diseases.  TREATMENT   Antibiotic medicine is used to treat a bacterial infection. Your health care provider will determine which bacteria are most likely causing the infection and will select an appropriate antibiotic. This is sometimes changed based on the results of tests performed to identify the bacteria, or if there is no response to the antibiotic selected. Antibiotics are usually given by mouth. You may also be  given medicine for pain.  Mastitis that occurs with breastfeeding will sometimes go away on its own, so your health care provider may choose to wait 24 hours after first seeing you to decide whether a prescription medicine is needed.  HOME CARE INSTRUCTIONS   · Only take over-the-counter or prescription medicines for pain, fever, or discomfort as directed by your health care provider.  · If your health care provider prescribed an antibiotic, take the medicine as directed. Make sure you finish it even if you start to feel better.  · Do not wear a tight or underwire bra. Wear a soft, supportive bra.  · Increase your fluid intake, especially if you have a fever.  · Women who are breastfeeding should follow these instructions:  ¨ Continue to empty the breast. Your health care provider can tell you whether this milk is safe for your infant or needs to be thrown out. You may be told to stop nursing until your health care provider thinks it is safe for your baby. Use a breast pump if you are advised to stop nursing.  ¨ Keep your nipples clean and dry.  ¨ Empty the first breast completely before going to the other breast. If your baby is not emptying your breasts completely for some reason, use a breast pump to empty your breasts.  ¨ If you go back to work, pump your breasts while at work to stay in time with your nursing schedule.  ¨ Avoid allowing your breasts to become overly filled with milk (engorged).  SEEK MEDICAL CARE IF:   · You have pus-like discharge from the breast.  · Your symptoms do not improve with the treatment prescribed by your health care provider within 2 days.  SEEK IMMEDIATE MEDICAL CARE IF:   · Your pain and swelling are getting worse.  · You have pain that is not controlled with medicine.  · You have a red line extending from the breast toward your armpit.  · You have a fever or persistent symptoms for more than 2-3 days.  · You have a fever and your symptoms suddenly get worse.  This information is  not intended to replace advice given to you by your health care provider. Make sure you discuss any questions you have with your health care provider.  Document Released: 12/18/2006 Document Revised: 12/23/2014 Document Reviewed: 07/18/2014  Elsevier Interactive Patient Education © 2017 [employer]

## 2020-05-08 NOTE — PROGRESS NOTES
"Subjective:      Maddie Santo is a 30 y.o. female who presents with Breast Problem (rt breast, thinks its mastitis, thinks she has thrush on her nipple x2 wks )      - This is a pleasant and non toxic appearing 30 y.o. female with c/o breast feeding and Rt nipple irritated x 2 wks and Rt breast tender x 2 days. No NVFC            ALLERGIES:  Patient has no known allergies.     PMH:  Past Medical History:   Diagnosis Date   • Disorder of thyroid    • Psychiatric problem     anxiety/depression        PSH:  History reviewed. No pertinent surgical history.    MEDS:    Current Outpatient Medications:   •  cephALEXin (KEFLEX) 500 MG Cap, Take 1 Cap by mouth 4 times a day for 7 days., Disp: 28 Cap, Rfl: 0  •  fluconazole (DIFLUCAN) 150 MG tablet, Take 1 Tab by mouth every 48 hours., Disp: 3 Tab, Rfl: 0  •  levothyroxine (SYNTHROID) 50 MCG Tab, Take 50 mcg by mouth Every morning on an empty stomach. Indications: Underactive Thyroid, Disp: , Rfl:     Current Facility-Administered Medications:   •  cefTRIAXone (ROCEPHIN) 500 mg, lidocaine (XYLOCAINE) 1 % 1.8 mL for IM use, 500 mg, Intramuscular, Once, Ludwig Christina M.D.    ** I have documented what I find to be significant in regards to past medical, social, family and surgical history  in my HPI or under PMH/PSH/FH review section, otherwise it is contributory **           HPI    Review of Systems   Skin:        Breast infected   All other systems reviewed and are negative.         Objective:     /68   Pulse 84   Temp 36.6 °C (97.9 °F) (Temporal)   Resp 16   Ht 1.727 m (5' 8\")   Wt 68 kg (150 lb)   LMP 12/07/2018   SpO2 98%   Breastfeeding Yes   BMI 22.81 kg/m²      Physical Exam  Vitals signs and nursing note reviewed.   Constitutional:       General: She is not in acute distress.     Appearance: She is well-developed. She is not diaphoretic.   HENT:      Head: Normocephalic and atraumatic.   Eyes:      Conjunctiva/sclera: Conjunctivae normal. "   Cardiovascular:      Heart sounds: Normal heart sounds. No murmur.   Pulmonary:      Effort: Pulmonary effort is normal. No respiratory distress.      Breath sounds: Normal breath sounds.   Chest:       Skin:     General: Skin is warm and dry.      Comments: Rt nipple: mild erythema    Rt breast: some induration and TTP, no erythema/warmth   Neurological:      Mental Status: She is alert.      Motor: No abnormal muscle tone.   Psychiatric:         Judgment: Judgment normal.                 Assessment/Plan:           1. Mastitis  cefTRIAXone (ROCEPHIN) 500 mg, lidocaine (XYLOCAINE) 1 % 1.8 mL for IM use    cephALEXin (KEFLEX) 500 MG Cap   2. Nipple infection  fluconazole (DIFLUCAN) 150 MG tablet       - rest  - E.R. precautions discussed     Dx & d/c instructions discussed w/ patient and/or family members.     Follow up with PCP (or UC if PCP is unavailable) in 2-3 days to make sure improving and no further additional treatment needed, ER if not improving or feeling/getting worse.    Any realistic and/or common medication side effects that may have been given today(i.e. Rash, GI upset/constipation, sedation, elevation of BP or blood sugars) reviewed.     Patient left in stable condition      reviewed if narcotics given

## 2022-01-18 ENCOUNTER — HOSPITAL ENCOUNTER (OUTPATIENT)
Dept: LAB | Facility: MEDICAL CENTER | Age: 32
End: 2022-01-18
Attending: OBSTETRICS & GYNECOLOGY
Payer: COMMERCIAL

## 2022-01-18 LAB — CYTOLOGY REG CYTOL: NORMAL

## 2022-01-18 PROCEDURE — 88175 CYTOPATH C/V AUTO FLUID REDO: CPT

## 2022-06-01 ENCOUNTER — HOSPITAL ENCOUNTER (OUTPATIENT)
Dept: LAB | Facility: MEDICAL CENTER | Age: 32
End: 2022-06-01
Attending: OBSTETRICS & GYNECOLOGY
Payer: COMMERCIAL

## 2022-06-01 PROCEDURE — 87081 CULTURE SCREEN ONLY: CPT

## 2022-06-01 PROCEDURE — 87150 DNA/RNA AMPLIFIED PROBE: CPT

## 2022-06-02 LAB — GP B STREP DNA SPEC QL NAA+PROBE: NEGATIVE

## 2024-01-22 ENCOUNTER — OFFICE VISIT (OUTPATIENT)
Dept: URGENT CARE | Facility: CLINIC | Age: 34
End: 2024-01-22
Payer: COMMERCIAL

## 2024-01-22 VITALS
BODY MASS INDEX: 24.13 KG/M2 | HEIGHT: 68 IN | OXYGEN SATURATION: 97 % | TEMPERATURE: 98 F | RESPIRATION RATE: 20 BRPM | DIASTOLIC BLOOD PRESSURE: 80 MMHG | WEIGHT: 159.2 LBS | HEART RATE: 67 BPM | SYSTOLIC BLOOD PRESSURE: 117 MMHG

## 2024-01-22 DIAGNOSIS — K52.9 GASTROENTERITIS: ICD-10-CM

## 2024-01-22 PROCEDURE — 3074F SYST BP LT 130 MM HG: CPT | Performed by: FAMILY MEDICINE

## 2024-01-22 PROCEDURE — 3079F DIAST BP 80-89 MM HG: CPT | Performed by: FAMILY MEDICINE

## 2024-01-22 PROCEDURE — 99203 OFFICE O/P NEW LOW 30 MIN: CPT | Performed by: FAMILY MEDICINE

## 2024-01-22 NOTE — PROGRESS NOTES
"  Subjective:      33 y.o. female presents to urgent care for GI upset that started on Friday. She had sudden onset of vomiting and diarrhea. Thre was no blood in vomit or stool. There is associated abdominal pain,   That is described as cramping.  No changes to urinary urgency, frequency, dysuria, or hematuria.  No recent travel, antibiotic use, or change in diet.  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  No prior history of abdominal surgery.    She denies any other questions or concerns at this time.    Current problem list, medication, and past medical/surgical history were reviewed in Epic.    ROS  See HPI     Objective:      /80   Pulse 67   Temp 36.7 °C (98 °F) (Temporal)   Resp 20   Ht 1.727 m (5' 8\")   Wt 72.2 kg (159 lb 3.2 oz)   SpO2 97%   BMI 24.21 kg/m²     Physical Exam  Constitutional:       General: She is not in acute distress.     Appearance: She is not diaphoretic.   Cardiovascular:      Rate and Rhythm: Normal rate and regular rhythm.      Heart sounds: Normal heart sounds.   Pulmonary:      Effort: Pulmonary effort is normal. No respiratory distress.      Breath sounds: Normal breath sounds.   Abdominal:      General: Bowel sounds are normal.      Palpations: Abdomen is soft.      Tenderness: There is no abdominal tenderness. There is no right CVA tenderness or left CVA tenderness. Negative signs include Ornelas's sign, Rovsing's sign and McBurney's sign.   Neurological:      Mental Status: She is alert.   Psychiatric:         Mood and Affect: Affect normal.         Judgment: Judgment normal.       Assessment/Plan:     1. 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  Most consistent with virus.  She was encouraged to stay well-hydrated.      Instructed to return to Urgent Care or nearest Emergency Department if symptoms fail to improve, for any change in condition, further concerns, or " new concerning symptoms. Patient states understanding of the plan of care and discharge instructions.    Afua Vogel M.D.

## 2024-04-12 ENCOUNTER — OFFICE VISIT (OUTPATIENT)
Dept: URGENT CARE | Facility: CLINIC | Age: 34
End: 2024-04-12
Payer: COMMERCIAL

## 2024-04-12 VITALS
SYSTOLIC BLOOD PRESSURE: 104 MMHG | HEIGHT: 68 IN | HEART RATE: 69 BPM | DIASTOLIC BLOOD PRESSURE: 68 MMHG | BODY MASS INDEX: 23.49 KG/M2 | RESPIRATION RATE: 14 BRPM | OXYGEN SATURATION: 97 % | TEMPERATURE: 98.7 F | WEIGHT: 155 LBS

## 2024-04-12 DIAGNOSIS — R05.1 ACUTE COUGH: ICD-10-CM

## 2024-04-12 PROCEDURE — 3078F DIAST BP <80 MM HG: CPT | Performed by: FAMILY MEDICINE

## 2024-04-12 PROCEDURE — 3074F SYST BP LT 130 MM HG: CPT | Performed by: FAMILY MEDICINE

## 2024-04-12 PROCEDURE — 99213 OFFICE O/P EST LOW 20 MIN: CPT | Performed by: FAMILY MEDICINE

## 2024-04-12 RX ORDER — DEXTROMETHORPHAN HYDROBROMIDE AND PROMETHAZINE HYDROCHLORIDE 15; 6.25 MG/5ML; MG/5ML
5 SYRUP ORAL EVERY 4 HOURS PRN
Qty: 120 ML | Refills: 0 | Status: SHIPPED | OUTPATIENT
Start: 2024-04-12

## 2024-04-12 RX ORDER — VALACYCLOVIR HYDROCHLORIDE 1 G/1
1000 TABLET, FILM COATED ORAL 3 TIMES DAILY
COMMUNITY
Start: 2024-03-08

## 2024-04-12 ASSESSMENT — ENCOUNTER SYMPTOMS
CARDIOVASCULAR NEGATIVE: 1
CONSTITUTIONAL NEGATIVE: 1
EYES NEGATIVE: 1
COUGH: 1

## 2024-04-12 NOTE — PROGRESS NOTES
"Dictation #1  MRN:[id number]  CSN:[id number] Subjective:   Maddie Santo is a 34 y.o. female who presents for Chest Pressure (X3 weeks, SOB, slight cough, headaches, and fatigue )      Nonproductive cough, no fever, no sob, does work in a lab and concerned about an allergy        Review of Systems   Constitutional: Negative.    HENT: Negative.     Eyes: Negative.    Respiratory:  Positive for cough.    Cardiovascular: Negative.    Genitourinary: Negative.    Skin: Negative.        Medications, Allergies, and current problem list reviewed today in Epic.     Objective:     /68   Pulse 69   Temp 37.1 °C (98.7 °F) (Temporal)   Resp 14   Ht 1.727 m (5' 8\")   Wt 70.3 kg (155 lb)   SpO2 97%     Physical Exam  Vitals and nursing note reviewed.   Constitutional:       Appearance: Normal appearance.   HENT:      Head: Normocephalic and atraumatic.      Right Ear: Tympanic membrane normal.      Left Ear: Tympanic membrane normal.      Nose: Nose normal.      Mouth/Throat:      Pharynx: Oropharynx is clear.   Eyes:      Extraocular Movements: Extraocular movements intact.      Pupils: Pupils are equal, round, and reactive to light.   Cardiovascular:      Rate and Rhythm: Normal rate and regular rhythm.      Pulses: Normal pulses.      Heart sounds: Normal heart sounds.   Pulmonary:      Effort: Pulmonary effort is normal.      Breath sounds: Normal breath sounds. No wheezing, rhonchi or rales.   Chest:      Chest wall: No tenderness.   Musculoskeletal:      Cervical back: Normal range of motion and neck supple. No tenderness.   Lymphadenopathy:      Cervical: Cervical adenopathy present.   Neurological:      Mental Status: She is alert.         Assessment/Plan:     Diagnosis and associated orders:     1. Acute cough  promethazine-dextromethorphan (PROMETHAZINE-DM) 6.25-15 MG/5ML syrup         Comments/MDM:              Differential diagnosis, natural history, supportive care, and indications for immediate follow-up " discussed.    Advised the patient to follow-up with the primary care physician for recheck, reevaluation, and consideration of further management.    Please note that this dictation was created using voice recognition software. I have made a reasonable attempt to correct obvious errors, but I expect that there are errors of grammar and possibly content that I did not discover before finalizing the note.    This note was electronically signed by Virgilio Kay M.D.

## 2024-11-19 ENCOUNTER — HOSPITAL ENCOUNTER (EMERGENCY)
Facility: MEDICAL CENTER | Age: 34
End: 2024-11-19
Attending: EMERGENCY MEDICINE
Payer: COMMERCIAL

## 2024-11-19 VITALS
WEIGHT: 160.94 LBS | BODY MASS INDEX: 24.39 KG/M2 | RESPIRATION RATE: 16 BRPM | DIASTOLIC BLOOD PRESSURE: 72 MMHG | TEMPERATURE: 97.5 F | OXYGEN SATURATION: 96 % | HEIGHT: 68 IN | SYSTOLIC BLOOD PRESSURE: 112 MMHG | HEART RATE: 95 BPM

## 2024-11-19 DIAGNOSIS — S61.216A LACERATION OF RIGHT LITTLE FINGER WITHOUT FOREIGN BODY WITHOUT DAMAGE TO NAIL, INITIAL ENCOUNTER: ICD-10-CM

## 2024-11-19 PROCEDURE — 304217 HCHG IRRIGATION SYSTEM

## 2024-11-19 PROCEDURE — 99282 EMERGENCY DEPT VISIT SF MDM: CPT

## 2024-11-19 PROCEDURE — 303353 HCHG DERMABOND SKIN ADHESIVE

## 2024-11-19 PROCEDURE — 304999 HCHG REPAIR-SIMPLE/INTERMED LEVEL 1

## 2024-11-19 ASSESSMENT — LIFESTYLE VARIABLES
EVER HAD A DRINK FIRST THING IN THE MORNING TO STEADY YOUR NERVES TO GET RID OF A HANGOVER: NO
DO YOU DRINK ALCOHOL: NO
HAVE YOU EVER FELT YOU SHOULD CUT DOWN ON YOUR DRINKING: NO
ON A TYPICAL DAY WHEN YOU DRINK ALCOHOL HOW MANY DRINKS DO YOU HAVE: 0
HAVE PEOPLE ANNOYED YOU BY CRITICIZING YOUR DRINKING: NO
EVER FELT BAD OR GUILTY ABOUT YOUR DRINKING: NO
TOTAL SCORE: 0
CONSUMPTION TOTAL: NEGATIVE
DOES PATIENT WANT TO STOP DRINKING: NO
TOTAL SCORE: 0
TOTAL SCORE: 0
HOW MANY TIMES IN THE PAST YEAR HAVE YOU HAD 5 OR MORE DRINKS IN A DAY: 0
AVERAGE NUMBER OF DAYS PER WEEK YOU HAVE A DRINK CONTAINING ALCOHOL: 0

## 2024-11-20 NOTE — ED TRIAGE NOTES
"Patient presents to the ER with the following complaints:    Chief Complaint   Patient presents with    Laceration     Medial right pinky laceration from knife in kitchen. Patient reports last TDAP vaccine 3 years ago.        /75   Pulse 93   Temp 36.2 °C (97.2 °F) (Temporal)   Resp 18   Ht 1.727 m (5' 8\")   Wt 73 kg (160 lb 15 oz)   LMP 11/05/2024   SpO2 94%   BMI 24.47 kg/m²       "

## 2024-11-20 NOTE — ED NOTES
Band-Aid applied to wound.     Patient is stable for discharge at this time, anticipatory guidance provided, close follow-up is encouraged, and ED return instructions have been detailed. Patient is both agreeable to the disposition and plan and discharged home in ambulatory state and in good condition.      Rx education provided, Pt verbalized understanding;

## 2024-11-20 NOTE — ED PROVIDER NOTES
ER Provider Note    Scribed for Jas Adler D.o. by Luana Martinez. 11/19/2024  6:34 PM    Primary Care Provider: Leonor Mayfield M.D.    CHIEF COMPLAINT   Chief Complaint   Patient presents with    Laceration     Medial right pinky laceration from knife in kitchen. Patient reports last TDAP vaccine 3 years ago.      EXTERNAL RECORDS REVIEWED  [facility]    HPI/ROS  LIMITATION TO HISTORY   Select: : None  OUTSIDE HISTORIAN(S):  None    Maddie Santo is a 34 y.o. female who presents to the ED complaining of a laceration to her right pinky finger onset earlier today. The patient states that she was cutting with a knife when she suddenly slipped, cutting her finger in the process. The patient notes that she began bleeding which concerned her and prompted her to report to the ED for further evaluation. She denies cleaning the wound prior to arrival. The patient notes that bending her finger exacerbates her pain.     PAST MEDICAL HISTORY  Past Medical History:   Diagnosis Date    Disorder of thyroid     Psychiatric problem     anxiety/depression       SURGICAL HISTORY  History reviewed. No pertinent surgical history.    FAMILY HISTORY  History reviewed. No pertinent family history.    SOCIAL HISTORY   reports that she has never smoked. She has never used smokeless tobacco. She reports current alcohol use. She reports that she does not use drugs.    CURRENT MEDICATIONS  Previous Medications    FLUCONAZOLE (DIFLUCAN) 150 MG TABLET    Take 1 Tab by mouth every 48 hours.    LEVOTHYROXINE (SYNTHROID) 50 MCG TAB    Take 50 mcg by mouth Every morning on an empty stomach. Indications: Underactive Thyroid    PROMETHAZINE-DEXTROMETHORPHAN (PROMETHAZINE-DM) 6.25-15 MG/5ML SYRUP    Take 5 mL by mouth every four hours as needed for Cough.    VALACYCLOVIR (VALTREX) 1 GM TAB    Take 1,000 mg by mouth 3 times a day.       ALLERGIES  Bactrim [sulfamethoxazole w-trimethoprim]    PHYSICAL  "EXAM  /75   Pulse 93   Temp 36.2 °C (97.2 °F) (Temporal)   Resp 18   Ht 1.727 m (5' 8\")   Wt 73 kg (160 lb 15 oz)   LMP 11/05/2024   SpO2 94%   BMI 24.47 kg/m²     General: No acute distress  Neuro: Awake alert and oriented, muscle strength sensation normal  Neck: Supple  Cardiac: Regular rate and rhythm  Pulmonary: Clear to auscultation bilaterally no distress  Abdomen: Soft nontender nondistended  Back: Nontender  Psych: Normal  Skin: Pink warm dry  Extremities: Right hand 8 mm laceration to the lateral pinky finger. Good circulation, motor function and sensation, full range of motion of the digit. No bleeding.     DIAGNOSTIC STUDIES    Laceration Repair Procedure Note    Indication: Laceration    Procedure: The patient was placed in the appropriate position. The wound was not contaminated.  The area was then irrigated with normal saline. The laceration was closed with Dermabond. There were no additional lacerations requiring repair. The wound area was then dressed with a sterile dressing.      Total repaired wound length: 8 mm.     Other Items: None    The patient tolerated the procedure well.    Complications: None      COURSE & MEDICAL DECISION MAKING     ASSESSMENT, COURSE AND PLAN  Care Narrative: The patient is a 34 y.o. female who presents to the ED complaining of a laceration to her right pinky finger onset earlier today.       6:34 PM - Patient presents to the ED with hand laceration. Discussed the plan of care with the patient including performing a laceration repair procedure. The patient was able to ask questions at this time. Patient agrees with the plan of care.     6:42 PM - Performed a laceration repair on the patient's hand at this time. Patient tolerated the procedure well with no complications. I discussed plan for discharge and follow up as outlined below. The patient is stable for discharge at this time and will return for any new or worsening symptoms. Patient verbalizes " understanding and support with my plan for discharge.       ADDITIONAL PROBLEM LIST  Finger laceration: Cleaned and repaired with Dermabond, discussed reasons to return.    DISPOSITION AND DISCUSSIONS  I have discussed management of the patient with the following physicians and SHERIDAN's:  None    Discussion of management with other QHP or appropriate source(s): None     Escalation of care considered, and ultimately not performed: diagnostic imaging.    Barriers to care at this time, including but not limited to:  None present .     Decision tools and prescription drugs considered including, but not limited to:  None .    The patient will return for new or worsening symptoms and is stable at the time of discharge.    DISPOSITION:  Patient will be discharged home in stable condition.    FOLLOW UP:  Leonor Mayfield M.D.  [address] [facility] [address] [place] [id number] [phone]    Schedule an appointment as soon as possible for a visit in 2 days      [facility], Emergency Dept  [address]  [place] [id number] [phone]    As needed, If symptoms worsen    FINAL DIAGNOSIS  1. Laceration of right little finger without foreign body without damage to nail, initial encounter    2.      Laceration repair procedure performed by Luana HSIEH (Scribe), am scribing for, and in the presence of, Jas Adler D.O..    Electronically signed by: Luana Martinez (Scribe), 11/19/2024    Jas RASHID D.O. personally performed the services described in this documentation, as scribed by Luana Martinez in my presence, and it is both accurate and complete.    The note accurately reflects work and decisions made by me.  Jas Adler D.O.  11/19/2024  10:43 PM